# Patient Record
(demographics unavailable — no encounter records)

---

## 2025-04-30 NOTE — PROCEDURE
[FreeTextEntry1] : CXR (04/30/2025) reveals a normal sized heart; scoliosis to the right, no evidence of infiltrate or effusion  Full PFT reveals mild-moderate restrictive dysfunction; FEV1 was 1.86L which is 75% of predicted, with no change on BD; moderately reduced lung volumes; normal diffusion at 21.35, which is 80% of predicted; normal flow volume loop. DINA test revealed normal MIP max of 77%; mildly reduced MEP max of 55%  PFTs were performed to evaluate for SOB  6 minute walk test reveals a low saturation of 96%, max HR 95; walked 391.2 meters   FENO was 24; a normal value being less than 25 Fractional exhaled nitric oxide (FENO) is regarded as a simple, noninvasive method for assessing eosinophilic airway inflammation. Produced by a variety of cells within the lung, nitric oxide (NO) concentrations are generally low in healthy individuals. However, high concentrations of NO appear to be involved in nonspecific host defense mechanisms and chronic inflammatory diseases such as asthma. The American Thoracic Society (ATS) therefore has recommended using FENO to aid in the diagnosis and monitoring of eosinophilic airway inflammation and asthma, and for identifying steroid responsive individuals whose chronic respiratory symptoms may be caused by airway inflammation.

## 2025-04-30 NOTE — PHYSICAL EXAM
[No Acute Distress] : no acute distress [Normal Oropharynx] : normal oropharynx [III] : Mallampati Class: III [Normal Appearance] : normal appearance [Neck Circumference: ___] : neck circumference: [unfilled] [No Neck Mass] : no neck mass [Normal Rate/Rhythm] : normal rate/rhythm [Normal S1, S2] : normal s1, s2 [No Murmurs] : no murmurs [No Resp Distress] : no resp distress [Clear to Auscultation Bilaterally] : clear to auscultation bilaterally [No Abnormalities] : no abnormalities [Benign] : benign [Normal Gait] : normal gait [No Clubbing] : no clubbing [No Cyanosis] : no cyanosis [No Edema] : no edema [FROM] : FROM [Normal Color/ Pigmentation] : normal color/ pigmentation [No Focal Deficits] : no focal deficits [Oriented x3] : oriented x3 [Normal Affect] : normal affect [TextBox_68] : I:E ratio 1:3; clear

## 2025-04-30 NOTE — ASSESSMENT
[FreeTextEntry1] : Mr. JONAS is a 60-year-old male, nonsmoker, [place] [employer] (1990-present) with a history of occupational exposure in the workplace ([employer]/11 [employer] x6 months), scoliosis, arthritis, renal colic/kidney stone, chronic sinus disease and GERD following 9/11 [employer] exposure, BRYAN on CPAP who now comes to the office for an initial pulmonary evaluation for SOB, likely asthma, frequent bronchitis, restrictive dysfunction, occupational exposure in the workplace ([employer], 9/11 [employer] x6 months), allergies/sinus, GERD/LPR, BRYAN on CPAP   His shortness of breath is multifactorial due to: -poor mechanics of breathing -out of shape -overweight -pulmonary disease   -?asthma   -restrictive dysfunction ?due to scoliosis   -DINA weakness -cardiac disease (Dr. Sae Martines/Dr. Kirstin GUADALUPE)   Problem 1: ?asthma -no role for inhaler -not a candidate for methacholine challenge due to reduced lung volumes -Asthma is believed to be caused by inherited (genetic) and environmental factor, but its exact cause is unknown. Asthma may be triggered by allergens, lung infections, or irritants in the air. Asthma triggers are different for each person.   Problem 1A: restrictive dysfunction ?due to scoliosis -complete HRCT of the chest 5/2025 -if CT of the chest is normal, complete neurology evaluation  Problem 1B: DINA weakness -recommended the Breather (30 reps, 2X per day) -revealed by either reduction in a patient's maximum inspiratory pressure (PI max) or maximum expiratory pressure (PE max), or both measured at the mouth. This can be related to a variety of medical issues such as neuromuscular disease, thyroid/parathyroid diseases, infections, poor nutrition, etc.   Problem 2: frequent bronchitis r/o immunodeficiency -complete blood work: quantitative immunoglobulins, IgG subsets, strep pneumonia titers, T cell subsets -Due to the fact that this pt has had more infections than would be expected and immunological blood work is indicated this would include: IgG subclasses, quantitative immunoglobulins, Strep pneumoniae titers as well as Vitamin D levels. Based on this blood work we will be able to decide where the pt needs additional pneumococcal vaccine either Prevnar 13 or Pneumovax. Immunology evaluation will also be potentially indicated.   Problem 3: allergies/sinus -ENT: Dr. Nesbitt -using Simply Saline -continue Flonase 1 sniff/nostril BID  -continue Astelin 0.10% 1 sniff/nostril BID  -complete blood work: asthma panel, food IgE panel, IgE level, eosinophil level, vitamin D level  -Environmental measures for allergies were encouraged including mattress and pillow covers, air purifier, and environmental controls.    Problem 4: GERD -continue Pantoprazole 40 mg QAM, pre-breakfast  -Rule of 2s: avoid eating too much, eating too late, eating too spicy, eating two hours before bed. -Things to avoid including overeating, spicy foods, tight clothing, eating within three hours of bed, this list is not all inclusive. -For treatment of reflux, possible options discussed including diet control, H2 blockers, PPIs, as well as coating motility agents discussed as treatment options. Timing of meals and proximity of last meal to sleep were discussed. If symptoms persist, a formal gastrointestinal evaluation is needed.    Problem 5: BRYAN on CPAP (elevated Mallampati class, nocturia, unrefreshed sleep, snoring) -CPAP FFM ([facility]) in place- tolerating well and benefiting from use -oral appliance fitting pending -Sleep apnea is associated with adverse clinical consequences which can affect most organ systems. Cardiovascular disease risk includes arrhythmias, atrial fibrillation, hypertension, coronary artery disease, and stroke. Metabolic disorders include diabetes type 2, non-alcoholic fatty liver disease. Mood disorder especially depression; and cognitive decline especially in the elderly. Associations with chronic reflux/Benitez's esophagus some but not all inclusive. -Reasons include arousal consistent with hypopnea; respiratory events most prominent in REM sleep or supine position; therefore sleep staging and body position are important for accurate diagnosis and estimation of AHI.    Problem 6: occupational exposure in the workplace ([employer], 9/11 [employer] x6 months) -complete regular pulmonology follow-ups  Problem 7: cardiac disease -recommended to follow up with Cardiologist (Dr. Sae Martines)   Problem 8: poor breathing mechanics -Recommended Naye Johnston and Thuan breathing technique -Proper breathing techniques were reviewed with an emphasis on exhalation. Patient was instructed to breathe in for 1 second and out for 4 seconds. The patient was encouraged to not talk while walking.   Problem 9: overweight/ out of shape -recommended walking and treading in a swimming pool -Weight loss, exercise, and diet control were discussed and are highly encouraged. Treatment options are given such as aqua therapy, and contacting a nutritionist. Recommended to use the elliptical, stationary bike, less use of the treadmill.   Problem 10: health maintenance -s/p yearly flu shot 2024 -recommended strep pneumonia vaccines: Prevnar-20 vaccine after the age of 65 -recommended early intervention for Upper Respiratory Infections (URIs) -recommended regular osteoporosis evaluations -recommended early dermatological evaluations -recommended after the age of 50 to the age of 70, colonoscopy every 5 years   F/P in 6-8 weeks. He is encouraged to call with any changes, concerns, or questions

## 2025-04-30 NOTE — ADDENDUM
[FreeTextEntry1] : Documented by Irma Saenz acting as a scribe for Dr. Kal Romero on 04/30/2025. All medical record entries made by the Scribe were at my, Dr. Kal Romero's, direction and personally dictated by me on 04/30/2025. I have reviewed the chart and agree that the record accurately reflects my personal performance of the history, physical exam, assessment and plan. I have also personally directed, reviewed, and agree with the discharge instructions.

## 2025-04-30 NOTE — REASON FOR VISIT
[Initial] : an initial visit [TextBox_44] : SOB, likely asthma, frequent bronchitis, occupational exposure in the workplace ([employer], 9/11 [employer] x6 months), restrictive dysfunction, allergies/sinus, GERD/LPR, BRYAN on CPAP

## 2025-04-30 NOTE — HISTORY OF PRESENT ILLNESS
[TextBox_4] : Mr. JONAS is a 60-year-old male, nonsmoker, [place] [employer] (1990-present) with a history of occupational exposure in the workplace (9/11 [employer] x6 months), scoliosis, arthritis, renal colic/kidney stone, chronic sinus disease and GERD following 9/11 [employer] exposure, BRYAN on CPAP presenting to the office today for an initial pulmonary evaluation. His chief complaint is   -he notes sinus Sx emerged first after 9/11, followed by GERD -he notes bouts of yearly bronchitis with wheezing s/p 9/11 [employer]TC. He's usually on Zithromax and steroids, which resolve his Sx  -he notes allergies are worst in the Spring (itchy eyes, PNDrip, nasal congestion) -he notes persistent globus sensation despite being on Pantoprazole -he notes occasional dysphonia -he notes varicose veins  -he notes worsening SOB with stairs and inclines -he notes s/p nuclear stress test 1 week ago with the [employer]. He hasn't yet heard about the results -he notes he could lose 10 lbs, though it's been stable -he notes his neck size is 17 -he notes snoring -he notes he uses the CPAP -he notes he's getting fitted for an oral appliance soon -he notes memory and concentration could be improved -he notes nocturia X1-2 -he notes s/p knee surgery 1-2 months ago, and he's in PT -he denies using an inhaler -he notes chronic generalized pains (shoulders, knees)  -he denies any headaches, nausea, emesis, fever, chills, sweats, chest pain, chest pressure, coughing, palpitations, constipation, diarrhea, vertigo, dysphagia, itchy ears, leg swelling, leg pain, myalgias, or sour taste in the mouth.